# Patient Record
Sex: MALE
[De-identification: names, ages, dates, MRNs, and addresses within clinical notes are randomized per-mention and may not be internally consistent; named-entity substitution may affect disease eponyms.]

---

## 2018-05-31 ENCOUNTER — HOSPITAL ENCOUNTER (EMERGENCY)
Dept: HOSPITAL 14 - H.ER | Age: 33
Discharge: HOME | End: 2018-05-31
Payer: COMMERCIAL

## 2018-05-31 VITALS
HEART RATE: 88 BPM | RESPIRATION RATE: 16 BRPM | TEMPERATURE: 98.2 F | DIASTOLIC BLOOD PRESSURE: 74 MMHG | SYSTOLIC BLOOD PRESSURE: 138 MMHG | OXYGEN SATURATION: 98 %

## 2018-05-31 DIAGNOSIS — K57.90: ICD-10-CM

## 2018-05-31 DIAGNOSIS — F41.9: ICD-10-CM

## 2018-05-31 DIAGNOSIS — K80.20: Primary | ICD-10-CM

## 2018-05-31 LAB
ALBUMIN SERPL-MCNC: 4 G/DL (ref 3.5–5)
ALBUMIN/GLOB SERPL: 1.2 {RATIO} (ref 1–2.1)
ALT SERPL-CCNC: 42 U/L (ref 21–72)
AST SERPL-CCNC: 28 U/L (ref 17–59)
BASOPHILS # BLD AUTO: 0.1 K/UL (ref 0–0.2)
BASOPHILS NFR BLD: 0.9 % (ref 0–2)
BUN SERPL-MCNC: 11 MG/DL (ref 9–20)
CALCIUM SERPL-MCNC: 9 MG/DL (ref 8.4–10.2)
EOSINOPHIL # BLD AUTO: 0.2 K/UL (ref 0–0.7)
EOSINOPHIL NFR BLD: 2.8 % (ref 0–4)
ERYTHROCYTE [DISTWIDTH] IN BLOOD BY AUTOMATED COUNT: 13.9 % (ref 11.5–14.5)
GFR NON-AFRICAN AMERICAN: > 60
HGB BLD-MCNC: 14.5 G/DL (ref 12–18)
LYMPHOCYTES # BLD AUTO: 1.1 K/UL (ref 1–4.3)
LYMPHOCYTES NFR BLD AUTO: 14.7 % (ref 20–40)
MCH RBC QN AUTO: 28.1 PG (ref 27–31)
MCHC RBC AUTO-ENTMCNC: 34.4 G/DL (ref 33–37)
MCV RBC AUTO: 81.5 FL (ref 80–94)
MONOCYTES # BLD: 0.6 K/UL (ref 0–0.8)
MONOCYTES NFR BLD: 8.2 % (ref 0–10)
NEUTROPHILS # BLD: 5.5 K/UL (ref 1.8–7)
NEUTROPHILS NFR BLD AUTO: 73.4 % (ref 50–75)
NRBC BLD AUTO-RTO: 0.1 % (ref 0–0)
PLATELET # BLD: 302 K/UL (ref 130–400)
PMV BLD AUTO: 7.2 FL (ref 7.2–11.7)
RBC # BLD AUTO: 5.17 MIL/UL (ref 4.4–5.9)
WBC # BLD AUTO: 7.6 K/UL (ref 4.8–10.8)

## 2018-05-31 PROCEDURE — 74177 CT ABD & PELVIS W/CONTRAST: CPT

## 2018-05-31 PROCEDURE — 96361 HYDRATE IV INFUSION ADD-ON: CPT

## 2018-05-31 PROCEDURE — 85025 COMPLETE CBC W/AUTO DIFF WBC: CPT

## 2018-05-31 PROCEDURE — 96360 HYDRATION IV INFUSION INIT: CPT

## 2018-05-31 PROCEDURE — 80053 COMPREHEN METABOLIC PANEL: CPT

## 2018-05-31 PROCEDURE — 99283 EMERGENCY DEPT VISIT LOW MDM: CPT

## 2018-05-31 NOTE — CT
PROCEDURE:  CT Abdomen and Pelvis with contrast



HISTORY:

Diarrhea, lower abdominal pain. 



COMPARISON:

None.



TECHNIQUE:

Contrast dose: 95 cc Visipaque 320



Radiation dose:



Total exam DLP = 881.48 mGy-cm.



This CT exam was performed using one or more of the following dose 

reduction techniques: Automated exposure control, adjustment of the 

mA and/or kV according to patient size, and/or use of iterative 

reconstruction technique.



FINDINGS:



LOWER THORAX:

Unremarkable. 



LIVER:

Hepatic steatosis. No gross lesion or ductal dilatation. 



GALLBLADDER AND BILE DUCTS:

Cholelithiasis without CT evidence of acute cholecystitis.  



PANCREAS:

Unremarkable. No gross lesion or ductal dilatation.



SPLEEN:

Unremarkable. 



ADRENALS:

Unremarkable. No mass. 



KIDNEYS AND URETERS:

Unremarkable. No hydronephrosis. No solid mass. 



Incidental finding(s):



Bilateral simple renal cysts the preponderance of which are less than 

1 cm. Largest cyst in the left kidney measures 1.9 cm. 



VASCULATURE:

Unremarkable. No aortic aneurysm. 



BOWEL:

The colon is collapsed accentuating thickening of the wall of the 

left hemicolon. There is evidence of diverticular disease without an 

acute inflammatory component. 



APPENDIX:

Normal appendix. 



PERITONEUM:

Unremarkable. No free fluid. No free air. 



LYMPH NODES:

Unremarkable. No enlarged lymph nodes. 



BLADDER:

Unremarkable. 



REPRODUCTIVE:

Unremarkable. 



BONES:

No acute fracture. 



OTHER FINDINGS:

None.



IMPRESSION:

Small gallstones and perhaps gallbladder polyps identified without 

evidence of gallbladder wall thickening or pericholecystic fluid.



Additional benign and/or incidental findings described above.

## 2018-05-31 NOTE — ED PDOC
HPI: Abdomen


Time Seen by Provider: 05/31/18 11:25


Chief Complaint (Nursing): Abdominal Pain


Chief Complaint (Provider): Abdominal Pain


History Per: Patient


History/Exam Limitations: no limitations


Onset/Duration Of Symptoms: Days (x4 months)


Current Symptoms Are (Timing): Still Present


Quality Of Discomfort: "Pain"


Associated Symptoms: Nausea, Diarrhea (mucousy watery x 4 months).  denies: 

Fever, Vomiting


Additional Complaint(s): 





33 year old male with a history of anxiety presents to the ED complaining of 

lower abdominal discomfort associated mucousy watery diarrhea and nausea onset 

four months. He denies vomiting, fever, blood in stools, or any other medical 

complaints. 





PMD: none provided





Past Medical History


Reviewed: Historical Data, Nursing Documentation, Vital Signs


Vital Signs: 


 Last Vital Signs











Temp  98.6 F   05/31/18 10:48


 


Pulse  98 H  05/31/18 10:48


 


Resp  21   05/31/18 10:48


 


BP  152/87 H  05/31/18 10:48


 


Pulse Ox  100   05/31/18 14:15














- Medical History


PMH: Anxiety





- Surgical History


Surgical History: No Surg Hx





- Family History


Family History: States: Unknown Family Hx





- Social History


Current smoker - smoking cessation education provided: Yes (some days)


Ex-Smoker (has not smoked in the last 12 months): No


Alcohol: Social


Drugs: Cannabis





- Home Medications


Home Medications: 


 Ambulatory Orders











 Medication  Instructions  Recorded


 


Atropine/Diphenoxylate [Lonox 1 tab PO TID #10 tab 05/31/18





0.025 MG-2.5 MG]  


 


Doxycycline Monohydrate 100 mg PO BID #20 tablet 05/31/18














- Allergies


Allergies/Adverse Reactions: 


 Allergies











Allergy/AdvReac Type Severity Reaction Status Date / Time


 


No Known Allergies Allergy   Verified 05/31/18 11:10














Review of Systems


Constitutional: Negative for: Fever


Gastrointestinal: Positive for: Nausea, Abdominal Pain (lower), Diarrhea (

mucousy and watery ), Other (no blood in stool).  Negative for: Vomiting





Physical Exam





- Reviewed


Nursing Documentation Reviewed: Yes


Vital Signs Reviewed: Yes





- Physical Exam


Appears: Positive for: Non-toxic, No Acute Distress


Head Exam: Positive for: ATRAUMATIC, NORMOCEPHALIC


Skin: Positive for: Normal Color, Warm, Dry


Eye Exam: Positive for: Normal appearance, EOMI, PERRL


ENT: Positive for: Other (moist mucous membranes)


Cardiovascular/Chest: Positive for: Regular Rate, Rhythm


Respiratory: Positive for: Normal Breath Sounds


Gastrointestinal/Abdominal: Positive for: Soft, Tenderness (mild tenderness RUQ 

RLQ).  Negative for: Guarding, Rebound


Neurologic/Psych: Positive for: Alert, Oriented (x3).  Negative for: Motor/

Sensory Deficits





- Laboratory Results


Result Diagrams: 


 05/31/18 11:45





 05/31/18 11:45





- ECG


O2 Sat by Pulse Oximetry: 100 (RA)


Pulse Ox Interpretation: Normal





Medical Decision Making


Medical Decision Making: 





Time: 11:30


Initial Plan: 


--CT ABD & pelvis IV contrast


--CMP


--CBC with differentials


--NS  mls/hr








Time: 13:41


CT abd & pelvis:


 


FINDINGS:





LOWER THORAX:


Unremarkable. 





LIVER:


Hepatic steatosis. No gross lesion or ductal dilatation. 





GALLBLADDER AND BILE DUCTS:


Cholelithiasis without CT evidence of acute cholecystitis.  





PANCREAS:


Unremarkable. No gross lesion or ductal dilatation.





SPLEEN:


Unremarkable. 





ADRENALS:


Unremarkable. No mass. 





KIDNEYS AND URETERS:


Unremarkable. No hydronephrosis. No solid mass. 





Incidental finding(s):





Bilateral simple renal cysts the preponderance of which are less than 1 cm. 

Largest cyst in the left kidney measures 1.9 cm. 





VASCULATURE:


Unremarkable. No aortic aneurysm. 





BOWEL:


The colon is collapsed accentuating thickening of the wall of the left 

hemicolon. There is evidence of diverticular disease without an acute 

inflammatory component. 





APPENDIX:


Normal appendix. 





PERITONEUM:


Unremarkable. No free fluid. No free air. 





LYMPH NODES:


Unremarkable. No enlarged lymph nodes. 





BLADDER:


Unremarkable. 





REPRODUCTIVE:


Unremarkable. 





BONES:


No acute fracture. 





OTHER FINDINGS:


None.





IMPRESSION:


Small gallstones and perhaps gallbladder polyps identified without evidence of 

gallbladder wall thickening or pericholecystic fluid.





Additional benign and/or incidental findings described above.














--------------------------------------------------------------------------------

----


Scribe Attestation: 


Documented by Darshana Dumont, acting as a scribe for Silvio Antunez MD





Provider Scribe Attestation:


All medical record entries made by the Scribe were at my direction and 

personally dictated by me. I have reviewed the chart and agree that the record 

accurately reflects my personal performance of the history, physical exam, 

medical decision making, and the department course for this patient. I have 

also personally directed, reviewed, and agree with the discharge instructions 

and disposition.





Disposition





- Clinical Impression


Clinical Impression: 


 Diverticulosis








- Patient ED Disposition


Is Patient to be Admitted: No


Counseled Patient/Family Regarding: Studies Performed, Diagnosis, Need For 

Followup, Rx Given





- Disposition


Referrals: 


Ortiz Lewis MD [Staff Provider] - 


Disposition: Routine/Home


Disposition Time: 14:42


Condition: FAIR


Prescriptions: 


Atropine/Diphenoxylate [Lonox 0.025 MG-2.5 MG] 1 tab PO TID #10 tab


Doxycycline Monohydrate 100 mg PO BID #20 tablet


Instructions:  Diverticulosis, Gallstones


Forms:  [employer] (English)